# Patient Record
Sex: FEMALE | Race: BLACK OR AFRICAN AMERICAN | NOT HISPANIC OR LATINO | URBAN - METROPOLITAN AREA
[De-identification: names, ages, dates, MRNs, and addresses within clinical notes are randomized per-mention and may not be internally consistent; named-entity substitution may affect disease eponyms.]

---

## 2019-10-31 ENCOUNTER — EMERGENCY (EMERGENCY)
Facility: HOSPITAL | Age: 29
LOS: 0 days | Discharge: HOME | End: 2019-10-31
Attending: EMERGENCY MEDICINE
Payer: MEDICAID

## 2019-10-31 VITALS — HEART RATE: 58 BPM | DIASTOLIC BLOOD PRESSURE: 102 MMHG | RESPIRATION RATE: 16 BRPM | SYSTOLIC BLOOD PRESSURE: 142 MMHG

## 2019-10-31 VITALS — HEIGHT: 67 IN | WEIGHT: 250 LBS

## 2019-10-31 DIAGNOSIS — S49.92XA UNSPECIFIED INJURY OF LEFT SHOULDER AND UPPER ARM, INITIAL ENCOUNTER: ICD-10-CM
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
## 2019-10-31 PROCEDURE — 96372 THER/PROPH/DIAG INJ SC/IM: CPT

## 2019-10-31 PROCEDURE — 73030 X-RAY EXAM OF SHOULDER: CPT | Mod: LT

## 2019-10-31 PROCEDURE — 73030 X-RAY EXAM OF SHOULDER: CPT | Mod: 26,LT

## 2019-10-31 PROCEDURE — 99283 EMERGENCY DEPT VISIT LOW MDM: CPT

## 2019-10-31 PROCEDURE — 81025 URINE PREGNANCY TEST: CPT

## 2019-10-31 PROCEDURE — 99283 EMERGENCY DEPT VISIT LOW MDM: CPT | Mod: 25

## 2019-10-31 RX ORDER — IBUPROFEN 200 MG
1 TABLET ORAL
Qty: 30 | Refills: 0
Start: 2019-10-31 | End: 2019-11-09

## 2019-10-31 RX ORDER — IBUPROFEN 200 MG
600 TABLET ORAL ONCE
Refills: 0 | Status: DISCONTINUED | OUTPATIENT
Start: 2019-10-31 | End: 2019-10-31

## 2019-10-31 RX ORDER — KETOROLAC TROMETHAMINE 30 MG/ML
60 SYRINGE (ML) INJECTION ONCE
Refills: 0 | Status: DISCONTINUED | OUTPATIENT
Start: 2019-10-31 | End: 2019-10-31

## 2019-10-31 RX ORDER — CYCLOBENZAPRINE HYDROCHLORIDE 10 MG/1
10 TABLET, FILM COATED ORAL ONCE
Refills: 0 | Status: COMPLETED | OUTPATIENT
Start: 2019-10-31 | End: 2019-10-31

## 2019-10-31 RX ORDER — CYCLOBENZAPRINE HYDROCHLORIDE 10 MG/1
1 TABLET, FILM COATED ORAL
Qty: 21 | Refills: 0
Start: 2019-10-31 | End: 2019-11-06

## 2019-10-31 RX ADMIN — Medication 60 MILLIGRAM(S): at 08:26

## 2019-10-31 RX ADMIN — CYCLOBENZAPRINE HYDROCHLORIDE 10 MILLIGRAM(S): 10 TABLET, FILM COATED ORAL at 08:25

## 2019-10-31 RX ADMIN — Medication 60 MILLIGRAM(S): at 08:50

## 2019-10-31 NOTE — ED PROVIDER NOTE - OBJECTIVE STATEMENT
28 y/o female with no pertinent PMHx presents to the ED c/o left shoulder pain s/p walking down the steps when she tripped and fell last night. Pt was holding onto the banister and fell with her arm stretched out. States pain worsens with movement. Denies head injury, paresthesia or LOC. no other complaints.

## 2019-10-31 NOTE — ED PROVIDER NOTE - PATIENT PORTAL LINK FT
You can access the FollowMyHealth Patient Portal offered by [facility] by registering at the following website: [email or web address]. By joining [employer]’s FollowMyHealth portal, you will also be able to view your health information using other applications (apps) compatible with our system.

## 2019-10-31 NOTE — ED PROVIDER NOTE - CARE PLAN
Principal Discharge DX:	Shoulder injury, left, initial encounter  Secondary Diagnosis:	Fall, initial encounter

## 2019-10-31 NOTE — ED PROVIDER NOTE - CARE PROVIDER_API CALL
Sulaiman Gardner (DO)  Orthopaedic Surgery  [address] [place] [address]  Phone: [phone]  Fax: [phone]  Follow Up Time:

## 2019-10-31 NOTE — ED ADULT NURSE NOTE - OBJECTIVE STATEMENT
Pt reports falling down five steps last night, grabbing rail with left hand while falling.  Pt states she had left shoulder pain and buttock pain after fall that have not resolved.  ROM limited by pain.

## 2019-10-31 NOTE — ED ADULT TRIAGE NOTE - CHIEF COMPLAINT QUOTE
Pt ambulatory to ED states last night she fell down approx 5 steps and injured left shoulder, denies LOC. This morning had decreased ROM in left shoulder and is unable to move it. Pt has good PMS distal to injury.

## 2019-10-31 NOTE — ED ADULT NURSE NOTE - NS_HUMANTRAFFICKQ4_ED_ALL_ED
Attempted to call pt to schedule seizure consult with Dr. Pride; no answer and no option to leave message.    No

## 2021-08-20 NOTE — ED ADULT NURSE NOTE - NSFALLRSKPSTHSTOCCUR_ED_ALL_ED
"Spoke with patient via phone, the date that surgery was tentatively rescheduled to, 10/8/2021, will not work for patient.    Completed the following rescheduling with patient via phone, then sent updated Surgery Packet to patient via [employer] including related scheduling information and prep instructions:     Required: Yes, you will need a  18 years or older to drive you home from your procedure as well as stay with you for 24 hours after your procedure    Surgery: Exam under anesthesia, excision and fulguration of anal condyloma     Date: Friday October 1, 2021          Surgeon: Dr. Isra Millan    Time: You will receive a call 1-3 days prior to your surgery with your finalized surgery and arrival time.     Location: [facility] - [address] [address]  [phone]      Pre-surgical Preparation: 2 [employer] (see \"Day of Surgery\")    Upcoming Appointments:     Pre-operative Physical appointment:   Clinic appointment with Leo Sutton MD: 9/22/2021 at 11:20 AM                                                 [address] [id number]    Pre-operative COVID-19 Test:  Pre-procedure asymptomatic COVID PCR:  9/29/2021 at 11:00 AM                                                                      [facility]                                                                         [address] [place] [id number]  " Single Mechanical/Accidental Fall
